# Patient Record
Sex: MALE | Race: WHITE | Employment: FULL TIME | URBAN - METROPOLITAN AREA
[De-identification: names, ages, dates, MRNs, and addresses within clinical notes are randomized per-mention and may not be internally consistent; named-entity substitution may affect disease eponyms.]

---

## 2018-06-15 ENCOUNTER — THERAPY VISIT (OUTPATIENT)
Dept: PHYSICAL THERAPY | Facility: CLINIC | Age: 43
End: 2018-06-15
Payer: COMMERCIAL

## 2018-06-15 DIAGNOSIS — S82.409A TIBIA/FIBULA FRACTURE: Primary | ICD-10-CM

## 2018-06-15 DIAGNOSIS — S82.209A TIBIA/FIBULA FRACTURE: Primary | ICD-10-CM

## 2018-06-15 DIAGNOSIS — Z47.89 ORTHOPEDIC AFTERCARE: ICD-10-CM

## 2018-06-15 PROCEDURE — 97110 THERAPEUTIC EXERCISES: CPT | Mod: GP | Performed by: PHYSICAL THERAPIST

## 2018-06-15 PROCEDURE — 97161 PT EVAL LOW COMPLEX 20 MIN: CPT | Mod: GP | Performed by: PHYSICAL THERAPIST

## 2018-06-15 NOTE — PROGRESS NOTES
"[facility] Initial Evaluation  Subjective:  Patient is a 42 year old male presenting with rehab right ankle/foot hpi.   Raciel Sandy is a 42 year old male with a right ankle condition.  Condition occurred with:  A wrong landing.  Condition occurred: during recreation/sport.  This is a new condition  Pt presents s/p closed intramedullary nailing, right tibia on 5-27-18. Pt reported onset of sx's after sliding into 2nd base while playing softball. Pt reported follow-up visit with surgeon on 6-12-18; at that time was advised to progress to use of crutches, WBAT. Pt reported he is currently staying with friends. Pt reported he was scheduled to return to work on 7-2-18 but now indicates that he does not think he will be able to return at that time due to job demands.    Site of Pain: foot in general, knee during movement.  Radiates to:  Foot.  Pain is described as aching and is intermittent Pain Scale: 0-4/10.  Associated symptoms:  Tingling, numbness, loss of motion/stiffness, loss of strength and edema.   Symptoms are exacerbated by walking and weight bearing and relieved by rest.  Since onset symptoms are unchanged.    Previous treatment includes surgery.    General health as reported by patient is good.                  Barriers include:  Lives alone (currently staying with friends).    Red flags:  None as reported by patient.                        Objective:    Gait:    Weight Bearing Status:  WBAT   Assistive Devices:  Crutches and brace            Ankle/Foot Evaluation  ROM:    AROM:    Dorsiflexion: Left:    Right:   12 degrees from neutral  Plantarflexion: Left:     Right:  20 derees  Inversion: Left:      Right:  6 degrees  Eversion:     Right:  5 degrees        Strength is not assessed.        EDEMA: Edema ankle: generalized swelling from the ankle to the forefoot.  Left ankle edema present at: 20\"  Right ankle edema present at:  20.5\"      Figure 8 left: 20\"Figure 8 right: 20.5\"       " "                                         Knee Evaluation:  ROM:    AROM    Hyperextension: Left:     Right: 3  Extension: Left:    Right:  0  Flexion: Left:   Right: 91        Strength:           Quad Set Right: Fair    Pain:        Edema:    Circumference:      Joint Line:  Left:  14.5\"   Right:  15.5\"            General     ROS    Assessment/Plan:    Patient is a 42 year old male with right side knee and right side ankle complaints.    Patient has the following significant findings with corresponding treatment plan.                Diagnosis 1:  S/p right intramedullary nailing, right tibia  Pain -  hot/cold therapy, self management, education and home program  Decreased ROM/flexibility - manual therapy and therapeutic exercise  Decreased strength - therapeutic exercise and therapeutic activities  Impaired balance - neuro re-education and therapeutic activities  Edema - cold therapy  Impaired gait - gait training  Impaired muscle performance - neuro re-education  Decreased function - therapeutic activities    Therapy Evaluation Codes:   1) History comprised of:   Personal factors that impact the plan of care:      None.    Comorbidity factors that impact the plan of care are:      None.     Medications impacting care: None.  2) Examination of Body Systems comprised of:   Body structures and functions that impact the plan of care:      Ankle and Knee.   Activity limitations that impact the plan of care are:      Bathing, Driving, Stairs, Standing and Walking.  3) Clinical presentation characteristics are:   Stable/Uncomplicated.  4) Decision-Making    Low complexity using standardized patient assessment instrument and/or measureable assessment of functional outcome.  Cumulative Therapy Evaluation is: Low complexity.    Previous and current functional limitations:  (See Goal Flow Sheet for this information)    Short term and Long term goals: (See Goal Flow Sheet for this information)     Communication ability:  " Patient appears to be able to clearly communicate and understand verbal and written communication and follow directions correctly.  Treatment Explanation - The following has been discussed with the patient:   RX ordered/plan of care  Anticipated outcomes  Possible risks and side effects  This patient would benefit from PT intervention to resume normal activities.   Rehab potential is good.    Frequency:  1 X week, once daily  Duration:  for 2-3 visits  Discharge Plan:  Achieve all LTG.  Independent in home treatment program.  Reach maximal therapeutic benefit.    Please refer to the daily flowsheet for treatment today, total treatment time and time spent performing 1:1 timed codes.

## 2018-06-15 NOTE — MR AVS SNAPSHOT
"              After Visit Summary   6/15/2018    Raciel Sandy    MRN: [id number]           Patient Information     Date Of Birth          1975        Visit Information        Provider Department      6/15/2018 10:50 AM Phoebe Pineda PT [facility] Physical Therapy        Today's Diagnoses     Tibia/fibula fracture    -  1    Orthopedic aftercare           Follow-ups after your visit        Your next 10 appointments already scheduled     Jun 22, 2018 10:10 AM CDT   IVY Extremity with Phoebe Pineda PT   [facility]    [address] [id number] [phone]              Who to contact     If you have questions or need follow up information about today's clinic visit or your schedule please contact [facility] PHYSICAL THERAPY directly at [phone].  Normal or non-critical lab and imaging results will be communicated to you by [employer], letter or phone within 4 business days after the clinic has received the results. If you do not hear from us within 7 days, please contact the clinic through [employer] or phone. If you have a critical or abnormal lab result, we will notify you by phone as soon as possible.  Submit refill requests through [employer] or call your pharmacy and they will forward the refill request to us. Please allow 3 business days for your refill to be completed.          Additional Information About Your Visit        MyChart Information     [employer] lets you send messages to your doctor, view your test results, renew your prescriptions, schedule appointments and more. To sign up, go to [email or web address] . Click on \"Log in\" on the left side of the screen, which will take you to the Welcome page. Then click on \"Sign up Now\" on the right side of the page.     You will be asked to enter the access code listed below, as well as " some personal information. Please follow the directions to create your username and password.     Your access code is: [email or web address]  Expires: 2018 10:37 AM     Your access code will  in 90 days. If you need help or a new code, please call your [place] clinic or [phone].        Care EveryWhere ID     This is your Care EveryWhere ID. This could be used by other organizations to access your [place] medical records  [id number]         Blood Pressure from Last 3 Encounters:   04/15/12 116/76    Weight from Last 3 Encounters:   No data found for Wt              We Performed the Following     IVY Inital Eval Report     PT Eval, Low Complexity (66507)     Therapeutic Exercises        Primary Care Provider Office Phone # Fax #    [facility] [phone]       [address] [place] [id number]        Equal Access to Services     [facility]IN : Hadii aad ku hadasho Soomaali, waaxda luqadaha, qaybta kaalmada adeegyada, waxay idiin hayaan yaneli neil . So [facility] [phone].    ATENCIÓN: Si habla español, tiene a garcia disposición servicios gratuitos de asistencia lingüística. Llame al [phone].    We comply with applicable federal civil rights laws and [place] laws. We do not discriminate on the basis of race, color, national origin, age, disability, sex, sexual orientation, or gender identity.            Thank you!     Thank you for choosing [facility] PHYSICAL THERAPY  for your care. Our goal is always to provide you with excellent care. Hearing back from our patients is one way we can continue to improve our services. Please take a few minutes to complete the written survey that you may receive in the mail after your visit with us. Thank you!             Your Updated Medication List - Protect others around you: Learn how to safely use, store and throw away your medicines at www.disposemymeds.org.          This list is accurate as of  6/15/18  5:33 PM.  Always use your most recent med list.                   Brand Name Dispense Instructions for use Diagnosis    NO ACTIVE MEDICATIONS

## 2018-06-15 NOTE — LETTER
[facility] PHYSICAL THERAPY  [address] [place] [id number] [phone]    2018    Re: Raciel Sandy   :   1975  MRN:  [id number]   REFERRING PHYSICIAN:   Caridad Alaniz    [facility] PHYSICAL THERAPY  Date of Initial Evaluation:  6/15/18  Visits:  Rxs Used: 1  Reason for Referral:     Tibia/fibula fracture  Orthopedic aftercare    EVALUATION SUMMARY  [facility] Initial Evaluation  Subjective:  Patient is a 42 year old male presenting with rehab right ankle/foot hpi.   Raciel Sandy is a 42 year old male with a right ankle condition.  Condition occurred with:  A wrong landing.  Condition occurred: during recreation/sport.  This is a new condition  Pt presents s/p closed intramedullary nailing, right tibia on 18. Pt reported onset of sx's after sliding into 2nd base while playing softball. Pt reported follow-up visit with surgeon on 18; at that time was advised to progress to use of crutches, WBAT. Pt reported he is currently staying with friends. Pt reported he was scheduled to return to work on 18 but now indicates that he does not think he will be able to return at that time due to job demands.    Site of Pain: foot in general, knee during movement.  Radiates to:  Foot.  Pain is described as aching and is intermittent Pain Scale: 0-4/10.  Associated symptoms:  Tingling, numbness, loss of motion/stiffness, loss of strength and edema.   Symptoms are exacerbated by walking and weight bearing and relieved by rest.  Since onset symptoms are unchanged.    Previous treatment includes surgery.    General health as reported by patient is good.                Barriers include:  Lives alone (currently staying with friends).  Red flags:  None as reported by patient.    Objective:  Gait:    Weight Bearing Status:  WBAT   Assistive Devices:  Crutches and brace    Ankle/Foot  "Evaluation  ROM:    AROM:    Dorsiflexion: Left:    Right:   12 degrees from neutral  Plantarflexion: Left:     Right:  20 derees  Inversion: Left:      Right:  6 degrees  Eversion:     Right:  5 degrees  Strength is not assessed.      Re: Raciel Sandy   :   1975    EDEMA: Edema ankle: generalized swelling from the ankle to the forefoot.  Left ankle edema present at: 20\"  Right ankle edema present at:  20.5\"  Figure 8 left: 20\"Figure 8 right: 20.5\"    Knee Evaluation:  ROM:    AROM  Hyperextension: Left:     Right: 3  Extension: Left:    Right:  0  Flexion: Left:   Right: 91  Strength:   Quad Set Right: Fair    Pain:  Edema:    Circumference:  Joint Line:  Left:  14.5\"   Right:  15.5\"    Assessment/Plan:    Patient is a 42 year old male with right side knee and right side ankle complaints.    Patient has the following significant findings with corresponding treatment plan.                Diagnosis 1:  S/p right intramedullary nailing, right tibia  Pain -  hot/cold therapy, self management, education and home program  Decreased ROM/flexibility - manual therapy and therapeutic exercise  Decreased strength - therapeutic exercise and therapeutic activities  Impaired balance - neuro re-education and therapeutic activities  Edema - cold therapy  Impaired gait - gait training  Impaired muscle performance - neuro re-education  Decreased function - therapeutic activities    Therapy Evaluation Codes:   1) History comprised of:   Personal factors that impact the plan of care:      None.    Comorbidity factors that impact the plan of care are:      None.     Medications impacting care: None.  2) Examination of Body Systems comprised of:   Body structures and functions that impact the plan of care:      Ankle and Knee.   Activity limitations that impact the plan of care are:      Bathing, Driving, Stairs, Standing and Walking.  3) Clinical presentation characteristics " are:   Stable/Uncomplicated.  4) Decision-Making    Low complexity using standardized patient assessment instrument and/or measureable assessment of functional outcome.  Cumulative Therapy Evaluation is: Low complexity.  Previous and current functional limitations:  (See Goal Flow Sheet for this information)    Short term and Long term goals: (See Goal Flow Sheet for this information)   Re: Raciel Sandy   :   1975    Communication ability:  Patient appears to be able to clearly communicate and understand verbal and written communication and follow directions correctly.  Treatment Explanation - The following has been discussed with the patient:   RX ordered/plan of care  Anticipated outcomes  Possible risks and side effects  This patient would benefit from PT intervention to resume normal activities.   Rehab potential is good.    Frequency:  1 X week, once daily  Duration:  for 2-3 visits  Discharge Plan:  Achieve all LTG.  Independent in home treatment program.  Reach maximal therapeutic benefit.    Thank you for your referral.    INQUIRIES  Therapist:   Phoebe Pineda, PT  [facility]  [address] [place] [id number]  Phone: [phone]  Fax: [phone]

## 2018-06-22 ENCOUNTER — THERAPY VISIT (OUTPATIENT)
Dept: PHYSICAL THERAPY | Facility: CLINIC | Age: 43
End: 2018-06-22
Payer: COMMERCIAL

## 2018-06-22 DIAGNOSIS — Z47.89 ORTHOPEDIC AFTERCARE: ICD-10-CM

## 2018-06-22 DIAGNOSIS — S82.409A TIBIA/FIBULA FRACTURE: ICD-10-CM

## 2018-06-22 DIAGNOSIS — S82.209A TIBIA/FIBULA FRACTURE: ICD-10-CM

## 2018-06-22 PROCEDURE — 97112 NEUROMUSCULAR REEDUCATION: CPT | Mod: GP | Performed by: PHYSICAL THERAPIST

## 2018-06-22 PROCEDURE — 97110 THERAPEUTIC EXERCISES: CPT | Mod: GP | Performed by: PHYSICAL THERAPIST

## 2018-06-22 NOTE — MR AVS SNAPSHOT
"              After Visit Summary   2018    Raciel Sandy    MRN: [id number]           Patient Information     Date Of Birth          1975        Visit Information        Provider Department      2018 10:10 AM Phoebe Pineda PT [facility] Physical Therapy        Today's Diagnoses     Tibia/fibula fracture        Orthopedic aftercare           Follow-ups after your visit        Who to contact     If you have questions or need follow up information about today's clinic visit or your schedule please contact [facility] PH[facility]ICAL [facility] directly at [phone].  Normal or non-critical lab and imaging results will be communicated to you by [employer], letter or phone within 4 business days after the clinic has received the results. If you do not hear from us within 7 days, please contact the clinic through [employer] or phone. If you have a critical or abnormal lab result, we will notify you by phone as soon as possible.  Submit refill requests through [employer] or call your pharmacy and they will forward the refill request to us. Please allow 3 business days for your refill to be completed.          Additional Information About Your Visit        MyChart Information     [employer] lets you send messages to your doctor, view your test results, renew your prescriptions, schedule appointments and more. To sign up, go to [email or web address] . Click on \"Log in\" on the left side of the screen, which will take you to the Welcome page. Then click on \"Sign up Now\" on the right side of the page.     You will be asked to enter the access code listed below, as well as some personal information. Please follow the directions to create your username and password.     Your access code is: [email or web address]  Expires: 2018 10:37 AM     Your access code will  in 90 days. If you need help or a new code, please call your [place] clinic or [phone].   "      Care EveryWhere ID     This is your Care EveryWhere ID. This could be used by other organizations to access your [place] medical records  [id number]         Blood Pressure from Last 3 Encounters:   04/15/12 116/76    Weight from Last 3 Encounters:   No data found for Wt              We Performed the Following     Neuromuscular Re-Education     Therapeutic Exercises        Primary Care Provider Office Phone # Fax #    [facility] [phone]       [address] [facility] [address]        Equal Access to Services     ZHANNA BERG : Hadii aad ku hadasho Soomaali, waaxda luqadaha, qaybta kaalmada adeegyada, waxay idiin hayaan adeeg kharash lalauro . So [facility] [phone].    ATENCIÓN: Si habla español, tiene a garcia disposición servicios gratuitos de asistencia lingüística. [facility] [phone].    We comply with applicable federal civil rights laws and [place] laws. We do not discriminate on the basis of race, color, national origin, age, disability, sex, sexual orientation, or gender identity.            Thank you!     Thank you for choosing [facility] PHYSICAL THERAPY  for your care. Our goal is always to provide you with excellent care. Hearing back from our patients is one way we can continue to improve our services. Please take a few minutes to complete the written survey that you may receive in the mail after your visit with us. Thank you!             Your Updated Medication List - Protect others around you: Learn how to safely use, store and throw away your medicines at www.disposemymeds.org.          This list is accurate as of 6/22/18  6:16 PM.  Always use your most recent med list.                   Brand Name Dispense Instructions for use Diagnosis    NO ACTIVE MEDICATIONS

## 2019-02-07 PROBLEM — Z47.89 ORTHOPEDIC AFTERCARE: Status: RESOLVED | Noted: 2018-06-15 | Resolved: 2019-02-07

## 2019-02-07 PROBLEM — S82.209A TIBIA/FIBULA FRACTURE: Status: RESOLVED | Noted: 2018-06-15 | Resolved: 2019-02-07

## 2019-02-07 PROBLEM — S82.409A TIBIA/FIBULA FRACTURE: Status: RESOLVED | Noted: 2018-06-15 | Resolved: 2019-02-07

## 2019-02-07 NOTE — PROGRESS NOTES
Raciel Sandy was seen 2 times for evaluation and treatment.  Patient did not return for further treatment and current status is unknown.  Due to short treatment duration, no objective or functional changes were made.  Please see goal flow sheet from episode noted date below and initial evaluation for further information.    Linked Episodes   Type: Episode: Status: Noted: Resolved: Last update: Updated by:   PHYSICAL THERAPY s/p closed intramedullary nailing, right tibia 6-15-18 Active 6/15/2018  6/22/2018 10:36 AM Phoebe Pineda, PT      Comments: